# Patient Record
Sex: MALE | Race: WHITE | NOT HISPANIC OR LATINO | Employment: OTHER | URBAN - NONMETROPOLITAN AREA
[De-identification: names, ages, dates, MRNs, and addresses within clinical notes are randomized per-mention and may not be internally consistent; named-entity substitution may affect disease eponyms.]

---

## 2021-05-21 ENCOUNTER — APPOINTMENT (EMERGENCY)
Dept: RADIOLOGY | Facility: HOSPITAL | Age: 81
End: 2021-05-21
Payer: COMMERCIAL

## 2021-05-21 ENCOUNTER — HOSPITAL ENCOUNTER (EMERGENCY)
Facility: HOSPITAL | Age: 81
Discharge: HOME | End: 2021-05-21
Attending: EMERGENCY MEDICINE | Admitting: EMERGENCY MEDICINE
Payer: COMMERCIAL

## 2021-05-21 VITALS
HEIGHT: 66 IN | TEMPERATURE: 97 F | BODY MASS INDEX: 22.39 KG/M2 | RESPIRATION RATE: 14 BRPM | HEART RATE: 51 BPM | WEIGHT: 139.33 LBS | DIASTOLIC BLOOD PRESSURE: 84 MMHG | OXYGEN SATURATION: 97 % | SYSTOLIC BLOOD PRESSURE: 165 MMHG

## 2021-05-21 DIAGNOSIS — T14.8XXA HEMATOMA: Primary | ICD-10-CM

## 2021-05-21 LAB
ANION GAP SERPL CALCULATED.3IONS-SCNC: 2 MMOL/L (ref 4–13)
APTT PPP: 26 SECONDS (ref 23–37)
BASOPHILS # BLD AUTO: 0.04 THOUSANDS/ΜL (ref 0–0.1)
BASOPHILS NFR BLD AUTO: 0 % (ref 0–1)
BUN SERPL-MCNC: 16 MG/DL (ref 5–25)
CALCIUM SERPL-MCNC: 8.7 MG/DL (ref 8.3–10.1)
CHLORIDE SERPL-SCNC: 104 MMOL/L (ref 100–108)
CO2 SERPL-SCNC: 32 MMOL/L (ref 21–32)
CREAT SERPL-MCNC: 1.25 MG/DL (ref 0.6–1.3)
EOSINOPHIL # BLD AUTO: 0.05 THOUSAND/ΜL (ref 0–0.61)
EOSINOPHIL NFR BLD AUTO: 1 % (ref 0–6)
ERYTHROCYTE [DISTWIDTH] IN BLOOD BY AUTOMATED COUNT: 12.9 % (ref 11.6–15.1)
GFR SERPL CREATININE-BSD FRML MDRD: 54 ML/MIN/1.73SQ M
GLUCOSE SERPL-MCNC: 101 MG/DL (ref 65–140)
HCT VFR BLD AUTO: 40.4 % (ref 36.5–49.3)
HGB BLD-MCNC: 12.8 G/DL (ref 12–17)
IMM GRANULOCYTES # BLD AUTO: 0.05 THOUSAND/UL (ref 0–0.2)
IMM GRANULOCYTES NFR BLD AUTO: 1 % (ref 0–2)
INR PPP: 1.09 (ref 0.84–1.19)
LYMPHOCYTES # BLD AUTO: 1.79 THOUSANDS/ΜL (ref 0.6–4.47)
LYMPHOCYTES NFR BLD AUTO: 19 % (ref 14–44)
MCH RBC QN AUTO: 28.8 PG (ref 26.8–34.3)
MCHC RBC AUTO-ENTMCNC: 31.7 G/DL (ref 31.4–37.4)
MCV RBC AUTO: 91 FL (ref 82–98)
MONOCYTES # BLD AUTO: 0.75 THOUSAND/ΜL (ref 0.17–1.22)
MONOCYTES NFR BLD AUTO: 8 % (ref 4–12)
NEUTROPHILS # BLD AUTO: 6.77 THOUSANDS/ΜL (ref 1.85–7.62)
NEUTS SEG NFR BLD AUTO: 71 % (ref 43–75)
NRBC BLD AUTO-RTO: 0 /100 WBCS
PLATELET # BLD AUTO: 141 THOUSANDS/UL (ref 149–390)
PMV BLD AUTO: 10.1 FL (ref 8.9–12.7)
POTASSIUM SERPL-SCNC: 4.7 MMOL/L (ref 3.5–5.3)
PROTHROMBIN TIME: 13.9 SECONDS (ref 11.6–14.5)
RBC # BLD AUTO: 4.44 MILLION/UL (ref 3.88–5.62)
SODIUM SERPL-SCNC: 138 MMOL/L (ref 136–145)
WBC # BLD AUTO: 9.45 THOUSAND/UL (ref 4.31–10.16)

## 2021-05-21 PROCEDURE — 99284 EMERGENCY DEPT VISIT MOD MDM: CPT

## 2021-05-21 PROCEDURE — 85730 THROMBOPLASTIN TIME PARTIAL: CPT | Performed by: EMERGENCY MEDICINE

## 2021-05-21 PROCEDURE — 73590 X-RAY EXAM OF LOWER LEG: CPT

## 2021-05-21 PROCEDURE — 85025 COMPLETE CBC W/AUTO DIFF WBC: CPT | Performed by: EMERGENCY MEDICINE

## 2021-05-21 PROCEDURE — 85610 PROTHROMBIN TIME: CPT | Performed by: EMERGENCY MEDICINE

## 2021-05-21 PROCEDURE — 80048 BASIC METABOLIC PNL TOTAL CA: CPT | Performed by: EMERGENCY MEDICINE

## 2021-05-21 PROCEDURE — 99285 EMERGENCY DEPT VISIT HI MDM: CPT | Performed by: EMERGENCY MEDICINE

## 2021-05-21 PROCEDURE — 36415 COLL VENOUS BLD VENIPUNCTURE: CPT | Performed by: EMERGENCY MEDICINE

## 2021-05-21 RX ORDER — AMLODIPINE BESYLATE 5 MG/1
5 TABLET ORAL DAILY
COMMUNITY
Start: 2021-04-20

## 2021-05-21 RX ORDER — ROSUVASTATIN CALCIUM 20 MG/1
20 TABLET, COATED ORAL DAILY
COMMUNITY

## 2021-05-21 RX ORDER — SUCRALFATE 1 G/1
1 TABLET ORAL 2 TIMES DAILY
COMMUNITY

## 2021-05-21 RX ORDER — LANOLIN ALCOHOL/MO/W.PET/CERES
1 CREAM (GRAM) TOPICAL 2 TIMES DAILY
COMMUNITY

## 2021-05-21 RX ORDER — CLOPIDOGREL BISULFATE 75 MG/1
75 TABLET ORAL DAILY
COMMUNITY

## 2021-05-21 RX ORDER — CHLORAL HYDRATE 500 MG
1000 CAPSULE ORAL DAILY
COMMUNITY

## 2021-05-21 RX ORDER — PREGABALIN 50 MG/1
50 CAPSULE ORAL 2 TIMES DAILY
COMMUNITY

## 2021-05-21 RX ORDER — PANTOPRAZOLE SODIUM 40 MG/1
40 TABLET, DELAYED RELEASE ORAL DAILY
COMMUNITY

## 2021-08-13 ENCOUNTER — APPOINTMENT (EMERGENCY)
Dept: RADIOLOGY | Facility: HOSPITAL | Age: 81
End: 2021-08-13
Payer: COMMERCIAL

## 2021-08-13 ENCOUNTER — HOSPITAL ENCOUNTER (EMERGENCY)
Facility: HOSPITAL | Age: 81
Discharge: HOME | End: 2021-08-13
Attending: EMERGENCY MEDICINE
Payer: COMMERCIAL

## 2021-08-13 VITALS
OXYGEN SATURATION: 95 % | SYSTOLIC BLOOD PRESSURE: 158 MMHG | DIASTOLIC BLOOD PRESSURE: 79 MMHG | RESPIRATION RATE: 18 BRPM | HEART RATE: 73 BPM | TEMPERATURE: 97.2 F

## 2021-08-13 DIAGNOSIS — S60.111A: Primary | ICD-10-CM

## 2021-08-13 DIAGNOSIS — S60.111A CONTUSION OF RIGHT THUMB NAIL, INITIAL ENCOUNTER: ICD-10-CM

## 2021-08-13 PROCEDURE — 99284 EMERGENCY DEPT VISIT MOD MDM: CPT | Performed by: EMERGENCY MEDICINE

## 2021-08-13 PROCEDURE — 99283 EMERGENCY DEPT VISIT LOW MDM: CPT

## 2021-08-13 PROCEDURE — 90715 TDAP VACCINE 7 YRS/> IM: CPT | Performed by: EMERGENCY MEDICINE

## 2021-08-13 PROCEDURE — 73140 X-RAY EXAM OF FINGER(S): CPT

## 2021-08-13 PROCEDURE — 90471 IMMUNIZATION ADMIN: CPT

## 2021-08-13 RX ORDER — CEPHALEXIN 250 MG/1
500 CAPSULE ORAL ONCE
Status: COMPLETED | OUTPATIENT
Start: 2021-08-13 | End: 2021-08-13

## 2021-08-13 RX ORDER — CEPHALEXIN 500 MG/1
500 CAPSULE ORAL EVERY 6 HOURS SCHEDULED
Qty: 20 CAPSULE | Refills: 0 | Status: SHIPPED | OUTPATIENT
Start: 2021-08-13 | End: 2021-08-18

## 2021-08-13 RX ADMIN — TETANUS TOXOID, REDUCED DIPHTHERIA TOXOID AND ACELLULAR PERTUSSIS VACCINE, ADSORBED 0.5 ML: 5; 2.5; 8; 8; 2.5 SUSPENSION INTRAMUSCULAR at 13:55

## 2021-08-13 RX ADMIN — CEPHALEXIN 500 MG: 250 CAPSULE ORAL at 13:55

## 2021-08-13 NOTE — ED NOTES
Pt in no acute distress  Ambulates with a steady gait   Verbalizes understanding of discharge instructions       Haydee Dominguez RN  08/13/21 6227

## 2021-08-13 NOTE — ED PROVIDER NOTES
History  Chief Complaint   Patient presents with    Finger Injury     pt states a week ago R thumb got  sliced by a rotor when working on car  pt states increase in pain  pt has been putting antibiotic ointment on wound  Injury to right thumb last week on a router while fixing a car  Complains of continued pain  Tetanus status on shore  No new injury  No fevers or chills  No nausea vomiting or diarrhea  History provided by:  Patient   used: No    Hand Pain  Location:  Right thumb pain  Quality:  Achy  Severity:  Mild  Onset quality:  Sudden  Duration:  1 week  Timing:  Constant  Progression:  Unchanged  Chronicity:  New  Context:  Right got injury to on a rotor fixing a car  Relieved by:  Nothing  Worsened by:  Palpation  Ineffective treatments:  None tried  Associated symptoms: no abdominal pain, no chest pain, no congestion, no cough, no diarrhea, no ear pain, no fever, no headaches, no loss of consciousness, no nausea, no rash, no shortness of breath, no sore throat, no vomiting and no wheezing        Prior to Admission Medications   Prescriptions Last Dose Informant Patient Reported? Taking?    ASPIRIN 81 PO   Yes No   Sig: Take 81 mg by mouth daily   Omega-3 Fatty Acids (fish oil) 1,000 mg   Yes No   Sig: Take 1,000 mg by mouth daily   amLODIPine (NORVASC) 5 mg tablet   Yes No   Sig: Take 5 mg by mouth daily   clopidogrel (PLAVIX) 75 mg tablet   Yes No   Sig: Take 75 mg by mouth daily   co-enzyme Q-10 50 MG capsule   Yes No   Sig: Take 100 mg by mouth daily   glucosamine-chondroitin 500-400 MG tablet   Yes No   Sig: Take 1 tablet by mouth 2 (two) times a day   metFORMIN (GLUCOPHAGE) 500 mg tablet   Yes No   Sig: Take 750 mg by mouth daily with breakfast   metoprolol tartrate (LOPRESSOR) 25 mg tablet   Yes No   Sig: Take 12 5 mg by mouth daily   pantoprazole (PROTONIX) 40 mg tablet   Yes No   Sig: Take 40 mg by mouth daily   pregabalin (LYRICA) 50 mg capsule   Yes No   Sig: Take 50 mg by mouth 2 (two) times a day   rosuvastatin (CRESTOR) 20 MG tablet   Yes No   Sig: Take 20 mg by mouth daily   sucralfate (CARAFATE) 1 g tablet   Yes No   Sig: Take 1 g by mouth 2 (two) times a day      Facility-Administered Medications: None       Past Medical History:   Diagnosis Date    Diabetes mellitus ([facility] [address] )     Hx of heart artery stent     x 18    Hypertension     Renal disorder     1 functioning kidney       Past Surgical History:   Procedure Laterality Date    BACK SURGERY      CARDIAC SURGERY      CAROTID STENT      CHOLECYSTECTOMY      EAR SURGERY      EYE SURGERY Bilateral     cataract       History reviewed  No pertinent family history  I have reviewed and agree with the history as documented  E-Cigarette/Vaping    E-Cigarette Use Never User      E-Cigarette/Vaping Substances     Social History     Tobacco Use    Smoking status: Former Smoker    Smokeless tobacco: Never Used   Vaping Use    Vaping Use: Never used   Substance Use Topics    Alcohol use: Not Currently    Drug use: Not Currently       Review of Systems   Constitutional: Negative for chills and fever  HENT: Negative for congestion, ear pain, hearing loss, sore throat, trouble swallowing and voice change  Eyes: Negative for pain and discharge  Respiratory: Negative for cough, shortness of breath and wheezing  Cardiovascular: Negative for chest pain and palpitations  Gastrointestinal: Negative for abdominal pain, blood in stool, constipation, diarrhea, nausea and vomiting  Genitourinary: Negative for dysuria, flank pain, frequency and hematuria  Musculoskeletal: Negative for joint swelling, neck pain and neck stiffness  Skin: Negative for rash and wound  Neurological: Negative for dizziness, seizures, loss of consciousness, syncope, facial asymmetry and headaches  Psychiatric/Behavioral: Negative for hallucinations, self-injury and suicidal ideas     All other systems reviewed and are negative  Physical Exam  Physical Exam  Constitutional:       General: He is not in acute distress  Appearance: Normal appearance  He is not ill-appearing  HENT:      Head: Normocephalic and atraumatic  Right Ear: External ear normal       Left Ear: External ear normal       Nose: Nose normal       Mouth/Throat:      Mouth: Mucous membranes are moist    Eyes:      Extraocular Movements: Extraocular movements intact  Pupils: Pupils are equal, round, and reactive to light  Cardiovascular:      Rate and Rhythm: Normal rate and regular rhythm  Pulmonary:      Effort: Pulmonary effort is normal  No respiratory distress  Breath sounds: Normal breath sounds  Abdominal:      General: Abdomen is flat  Bowel sounds are normal  There is no distension  Palpations: Abdomen is soft  Tenderness: There is no abdominal tenderness  Musculoskeletal:         General: No swelling or tenderness  Cervical back: Normal range of motion and neck supple  Comments: Right thumb full range of motion  There is a laceration x2 at the nail  There is old dry blood  There is no surrounding erythema or warmth or fluctuance  Skin:     General: Skin is warm and dry  Capillary Refill: Capillary refill takes less than 2 seconds  Neurological:      General: No focal deficit present  Mental Status: He is alert and oriented to person, place, and time     Psychiatric:         Mood and Affect: Mood normal          Behavior: Behavior normal          Vital Signs  ED Triage Vitals   Temperature Pulse Respirations Blood Pressure SpO2   08/13/21 1317 08/13/21 1317 08/13/21 1317 08/13/21 1318 08/13/21 1317   (!) 97 2 °F (36 2 °C) 73 18 158/79 95 %      Temp Source Heart Rate Source Patient Position - Orthostatic VS BP Location FiO2 (%)   08/13/21 1317 08/13/21 1317 08/13/21 1317 08/13/21 1317 --   Temporal Monitor Sitting Left arm       Pain Score       08/13/21 1317       4           Vitals: 08/13/21 1317 08/13/21 1318   BP:  158/79   Pulse: 73    Patient Position - Orthostatic VS: Sitting          Visual Acuity      ED Medications  Medications   tetanus-diphtheria-acellular pertussis (BOOSTRIX) IM injection 0 5 mL (has no administration in time range)   cephalexin (KEFLEX) capsule 500 mg (has no administration in time range)       Diagnostic Studies  Results Reviewed     None                 XR thumb first digit-min 2 views RIGHT   ED Interpretation by Julia Sigala MD (08/13 1025)   No fx                 Procedures  Procedures         ED Course                                           MDM    Disposition  Final diagnoses:   Hematoma, subungual, thumb, right, initial encounter   Contusion of right thumb nail, initial encounter     Time reflects when diagnosis was documented in both MDM as applicable and the Disposition within this note     Time User Action Codes Description Comment    8/13/2021  1:34 PM Visperas, Florestine Shone Add [S60 111A] Hematoma, subungual, thumb, right, initial encounter     8/13/2021  1:36 PM Visperas, Florestine Shone Add [S60 111A] Contusion of right thumb nail, initial encounter       ED Disposition     ED Disposition Condition Date/Time Comment    Discharge Stable Fri Aug 13, 2021  1:33 PM Arcadio De La Rosa discharge to home/self care  Follow-up Information     Follow up With Specialties Details Why Contact Info    Griffin Grideral, DO General Practice Call in 2 days  [address]  [phone]            Patient's Medications   Discharge Prescriptions    CEPHALEXIN (KEFLEX) 500 MG CAPSULE    Take 1 capsule (500 mg total) by mouth every 6 (six) hours for 5 days       Start Date: 8/13/2021 End Date: 8/18/2021       Order Dose: 500 mg       Quantity: 20 capsule    Refills: 0     No discharge procedures on file      PDMP Review     None          ED Provider  Electronically Signed by           Julia Sigala MD  08/13/21 Naomie Vann MD  08/13/21 3777

## 2022-02-28 ENCOUNTER — HOSPITAL ENCOUNTER (EMERGENCY)
Facility: HOSPITAL | Age: 82
Discharge: HOME | End: 2022-02-28
Attending: STUDENT IN AN ORGANIZED HEALTH CARE EDUCATION/TRAINING PROGRAM | Admitting: STUDENT IN AN ORGANIZED HEALTH CARE EDUCATION/TRAINING PROGRAM
Payer: COMMERCIAL

## 2022-02-28 VITALS
HEIGHT: 66 IN | TEMPERATURE: 97.3 F | WEIGHT: 142.42 LBS | RESPIRATION RATE: 20 BRPM | BODY MASS INDEX: 22.89 KG/M2 | DIASTOLIC BLOOD PRESSURE: 74 MMHG | OXYGEN SATURATION: 96 % | SYSTOLIC BLOOD PRESSURE: 135 MMHG | HEART RATE: 77 BPM

## 2022-02-28 DIAGNOSIS — M79.605 LOW BACK PAIN RADIATING TO LEFT LOWER EXTREMITY: Primary | ICD-10-CM

## 2022-02-28 DIAGNOSIS — M54.50 LOW BACK PAIN RADIATING TO LEFT LOWER EXTREMITY: Primary | ICD-10-CM

## 2022-02-28 PROCEDURE — 96374 THER/PROPH/DIAG INJ IV PUSH: CPT

## 2022-02-28 PROCEDURE — 96372 THER/PROPH/DIAG INJ SC/IM: CPT

## 2022-02-28 PROCEDURE — 99284 EMERGENCY DEPT VISIT MOD MDM: CPT

## 2022-02-28 PROCEDURE — 99284 EMERGENCY DEPT VISIT MOD MDM: CPT | Performed by: STUDENT IN AN ORGANIZED HEALTH CARE EDUCATION/TRAINING PROGRAM

## 2022-02-28 RX ORDER — KETOROLAC TROMETHAMINE 30 MG/ML
15 INJECTION, SOLUTION INTRAMUSCULAR; INTRAVENOUS ONCE
Status: COMPLETED | OUTPATIENT
Start: 2022-02-28 | End: 2022-02-28

## 2022-02-28 RX ORDER — DEXAMETHASONE SODIUM PHOSPHATE 4 MG/ML
10 INJECTION, SOLUTION INTRA-ARTICULAR; INTRALESIONAL; INTRAMUSCULAR; INTRAVENOUS; SOFT TISSUE ONCE
Status: COMPLETED | OUTPATIENT
Start: 2022-02-28 | End: 2022-02-28

## 2022-02-28 RX ORDER — LIDOCAINE 50 MG/G
1 PATCH TOPICAL ONCE
Status: DISCONTINUED | OUTPATIENT
Start: 2022-02-28 | End: 2022-02-28 | Stop reason: HOSPADM

## 2022-02-28 RX ORDER — ACETAMINOPHEN 325 MG/1
975 TABLET ORAL ONCE
Status: COMPLETED | OUTPATIENT
Start: 2022-02-28 | End: 2022-02-28

## 2022-02-28 RX ORDER — BUPIVACAINE HYDROCHLORIDE 5 MG/ML
10 INJECTION, SOLUTION EPIDURAL; INTRACAUDAL ONCE
Status: COMPLETED | OUTPATIENT
Start: 2022-02-28 | End: 2022-02-28

## 2022-02-28 RX ADMIN — LIDOCAINE 1 PATCH: 50 PATCH TOPICAL at 19:18

## 2022-02-28 RX ADMIN — KETOROLAC TROMETHAMINE 15 MG: 30 INJECTION, SOLUTION INTRAMUSCULAR at 19:17

## 2022-02-28 RX ADMIN — BUPIVACAINE HYDROCHLORIDE 10 ML: 5 INJECTION, SOLUTION EPIDURAL; INTRACAUDAL; PERINEURAL at 19:17

## 2022-02-28 RX ADMIN — ACETAMINOPHEN 975 MG: 325 TABLET ORAL at 19:18

## 2022-02-28 RX ADMIN — DEXAMETHASONE SODIUM PHOSPHATE 10 MG: 4 INJECTION, SOLUTION INTRAMUSCULAR; INTRAVENOUS at 19:17

## 2022-02-28 NOTE — ED PROVIDER NOTES
History  Chief Complaint   Patient presents with    Flank Pain     Pt reports left flank pain radiating into left thigh, reports hx of right kidney removal but denies any changes in urination or injury        History provided by:  Patient  Back Pain  Location:  Sacro-iliac joint  Quality:  Stabbing  Radiates to:  L posterior upper leg and L thigh  Pain severity:  Severe  Pain is:  Unable to specify  Onset quality:  Gradual  Timing:  Constant  Progression:  Worsening  Chronicity:  Recurrent  Context: not lifting heavy objects, not physical stress, not recent injury and not twisting    Relieved by:  Nothing  Worsened by: Movement, twisting and ambulation  Ineffective treatments: icy hot, TENS, muscle relaxant   Associated symptoms: leg pain    Associated symptoms: no abdominal pain, no bladder incontinence, no bowel incontinence, no chest pain, no dysuria, no fever, no headaches, no numbness, no paresthesias, no pelvic pain, no perianal numbness, no tingling and no weakness       79year old M  Hx of sciatica  Presents to the ED with worsening left lower back pain with radiation into the left lateral/posterior lower extremity  The patient has been using a TENS unit, applying [facility] and taking a prescribed muscle relaxer  Movement exacerbates the pain while rest improves his pain  Describes his pain as sharp in nature and 6/10 in severity  Denies red flag symptoms of cauda equina syndrome  Prior to Admission Medications   Prescriptions Last Dose Informant Patient Reported? Taking?    ASPIRIN 81 PO   Yes No   Sig: Take 81 mg by mouth daily   Omega-3 Fatty Acids (fish oil) 1,000 mg   Yes No   Sig: Take 1,000 mg by mouth daily   amLODIPine (NORVASC) 5 mg tablet   Yes No   Sig: Take 5 mg by mouth daily   clopidogrel (PLAVIX) 75 mg tablet   Yes No   Sig: Take 75 mg by mouth daily   co-enzyme Q-10 50 MG capsule   Yes No   Sig: Take 100 mg by mouth daily   glucosamine-chondroitin 500-400 MG tablet   Yes No   Sig: Take 1 tablet by mouth 2 (two) times a day   metFORMIN (GLUCOPHAGE) 500 mg tablet   Yes No   Sig: Take 750 mg by mouth daily with breakfast   metoprolol tartrate (LOPRESSOR) 25 mg tablet   Yes No   Sig: Take 12 5 mg by mouth daily   pantoprazole (PROTONIX) 40 mg tablet   Yes No   Sig: Take 40 mg by mouth daily   pregabalin (LYRICA) 50 mg capsule   Yes No   Sig: Take 50 mg by mouth 2 (two) times a day   rosuvastatin (CRESTOR) 20 MG tablet   Yes No   Sig: Take 20 mg by mouth daily   sucralfate (CARAFATE) 1 g tablet   Yes No   Sig: Take 1 g by mouth 2 (two) times a day      Facility-Administered Medications: None       Past Medical History:   Diagnosis Date    Diabetes mellitus ([facility] [address] )     Hx of heart artery stent     x 18    Hypertension     Renal disorder     1 functioning kidney       Past Surgical History:   Procedure Laterality Date    BACK SURGERY      CARDIAC SURGERY      CAROTID STENT      CHOLECYSTECTOMY      EAR SURGERY      EYE SURGERY Bilateral     cataract       History reviewed  No pertinent family history  I have reviewed and agree with the history as documented  E-Cigarette/Vaping    E-Cigarette Use Never User      E-Cigarette/Vaping Substances     Social History     Tobacco Use    Smoking status: Former Smoker    Smokeless tobacco: Never Used   Vaping Use    Vaping Use: Never used   Substance Use Topics    Alcohol use: Not Currently    Drug use: Not Currently     Review of Systems   Constitutional: Negative for chills and fever  HENT: Negative for congestion, rhinorrhea, sinus pressure and sinus pain  Respiratory: Negative for cough, chest tightness and shortness of breath  Cardiovascular: Negative for chest pain and palpitations  Gastrointestinal: Negative for abdominal pain, bowel incontinence, nausea and vomiting  Genitourinary: Negative for bladder incontinence, decreased urine volume, difficulty urinating, dysuria, flank pain, frequency, pelvic pain and urgency  Musculoskeletal: Positive for arthralgias, back pain and gait problem  Negative for myalgias, neck pain and neck stiffness  Skin: Negative for color change, pallor, rash and wound  Neurological: Negative for dizziness, tingling, weakness, light-headedness, numbness, headaches and paresthesias  Hematological: Does not bruise/bleed easily  Psychiatric/Behavioral: Positive for sleep disturbance  Negative for confusion  All other systems reviewed and are negative  Physical Exam  Physical Exam  Vitals and nursing note reviewed  Constitutional:       General: He is not in acute distress  Appearance: He is not ill-appearing  HENT:      Head: Normocephalic and atraumatic  Right Ear: External ear normal       Left Ear: External ear normal    Eyes:      General:         Right eye: No discharge  Left eye: No discharge  Extraocular Movements: Extraocular movements intact  Conjunctiva/sclera: Conjunctivae normal    Cardiovascular:      Rate and Rhythm: Normal rate and regular rhythm  Pulses: Normal pulses  Heart sounds: Normal heart sounds  Pulmonary:      Effort: Pulmonary effort is normal  No respiratory distress  Breath sounds: Normal breath sounds  No stridor  No wheezing, rhonchi or rales  Chest:      Chest wall: No tenderness  Abdominal:      General: Abdomen is flat  Bowel sounds are normal       Palpations: Abdomen is soft  Tenderness: There is no abdominal tenderness  There is no right CVA tenderness, left CVA tenderness, guarding or rebound  Musculoskeletal:         General: Tenderness present  No swelling, deformity or signs of injury  Cervical back: Neck supple  No tenderness  Back:       Comments: Tenderness to palpation/hypertonicity of the musculature of the left lower back  No midline spine tenderness  No overlying skin changes noted  Tenderness to palpation along the left lateral thigh     Skin:     General: Skin is warm and dry       Capillary Refill: Capillary refill takes less than 2 seconds  Coloration: Skin is not jaundiced or pale  Findings: No bruising, erythema, lesion or rash  Neurological:      General: No focal deficit present  Mental Status: He is alert and oriented to person, place, and time  Mental status is at baseline  Cranial Nerves: No cranial nerve deficit  Sensory: No sensory deficit  Motor: No weakness  Comments: Bilateral patellar, Achilles DTRs are intact  5/5 muscle strength of the bilateral lower extremities  Psychiatric:         Mood and Affect: Mood normal          Behavior: Behavior normal          Thought Content: Thought content normal          Judgment: Judgment normal        Vital Signs  ED Triage Vitals   Temperature Pulse Respirations Blood Pressure SpO2   02/28/22 1849 02/28/22 1849 02/28/22 1849 02/28/22 1849 02/28/22 1849   (!) 97 3 °F (36 3 °C) 77 20 135/74 96 %      Temp Source Heart Rate Source Patient Position - Orthostatic VS BP Location FiO2 (%)   02/28/22 1849 02/28/22 1849 02/28/22 1849 02/28/22 1849 --   Temporal Monitor Lying Right arm       Pain Score       02/28/22 1917       3           Vitals:    02/28/22 1849   BP: 135/74   Pulse: 77   Patient Position - Orthostatic VS: Lying     ED Medications  Medications   lidocaine (LIDODERM) 5 % patch 1 patch (1 patch Topical Medication Applied 2/28/22 1918)   ketorolac (TORADOL) injection 15 mg (15 mg Intramuscular Given 2/28/22 1917)   dexamethasone (DECADRON) injection 10 mg (10 mg Intramuscular Given 2/28/22 1917)   acetaminophen (TYLENOL) tablet 975 mg (975 mg Oral Given 2/28/22 1918)   bupivacaine (PF) (MARCAINE) 0 5 % injection 10 mL (10 mL Injection Given 2/28/22 1917)     Diagnostic Studies  Results Reviewed     None             No orders to display          Procedures  Trigger Injection 1 or 2 muscles    Date/Time: 2/28/2022 7:31 PM  Performed by: Swathi Hernandez DO  Authorized by:  Floyd Hill DO Kathe     Patient location:  ED  Other Assisting Provider: No    Consent:     Consent obtained:  Verbal    Consent given by:  Patient    Risks discussed: Allergic reaction, infection, nerve damage, unsuccessful block, swelling, pain, intravenous injection and bleeding    Alternatives discussed:  No treatment  Universal protocol:     Patient identity confirmed:  Verbally with patient  Location:     Body area:  Lower back    Injection Trigger Points:  2  Pre-procedure details:     Skin preparation:  Antiseptic wash  Skin anesthesia:     Skin anesthesia method:  None  Procedure details:     Needle gauge:  25 G    Anesthetic injected:  Bupivacaine 0 5% w/o epi    Steroid injected:  None    Additive injected:  None    Injection procedure:  Anatomic landmarks identified, incremental injection, negative aspiration for blood, anatomic landmarks palpated and introduced needle  Post-procedure details:     Dressing:  None    Outcome:  Pain relieved    Patient tolerance of procedure: Tolerated well, no immediate complications      ED Course       MDM     19-year-old male  Presents to the emergency department with worsening left lower back pain with radiation into the left upper extremity  He states that he has a history of sciatica  Has been using icy Hot, TENS, prescribed muscle relaxers without affect  Denies recent trauma, red flag symptoms of cauda equina syndrome  On exam, the patient has tenderness to palpation along left lumbar paraspinal musculature, left lateral thigh  No midline spine tenderness  Bilateral patellar/Achilles DTRs are intact  5/5 muscle strength of the lower extremities  Given the patient has hypertonicity/tenderness to palpation along the musculature of the left lower back, a trigger point injection was performed at the bedside  See procedure note above  Pain improved    The patient was also administered a dose of intramuscular ketorolac, intramuscular Decadron, oral Tylenol which improved his pain  Lidocaine patch applied  Signs and symptoms likely related to sciatica and/or IT band syndrome  Will likely require physical therapy  Follow-up with the primary care provider was recommended  Other supportive care measures and return precautions were discussed  All questions were addressed  The patient was stable for discharge  Disposition  Final diagnoses:   Low back pain radiating to left lower extremity     Time reflects when diagnosis was documented in both MDM as applicable and the Disposition within this note     Time User Action Codes Description Comment    2/28/2022  7:59 PM Erik Ramos Add [M54 50,  M09 605] Low back pain radiating to left lower extremity       ED Disposition     ED Disposition Condition Date/Time Comment    Discharge Stable Mon Feb 28, 2022  7:58 PM Shan Juarez discharge to home/self care              Follow-up Information     Follow up With Specialties Details Why [facility],  [facility]   [address]  [phone]            Discharge Medication List as of 2/28/2022  8:02 PM      CONTINUE these medications which have NOT CHANGED    Details   amLODIPine (NORVASC) 5 mg tablet Take 5 mg by mouth daily, Starting Tue 4/20/2021, Historical Med      ASPIRIN 81 PO Take 81 mg by mouth daily, Historical Med      clopidogrel (PLAVIX) 75 mg tablet Take 75 mg by mouth daily, Historical Med      co-enzyme Q-10 50 MG capsule Take 100 mg by mouth daily, Historical Med      glucosamine-chondroitin 500-400 MG tablet Take 1 tablet by mouth 2 (two) times a day, Historical Med      metFORMIN (GLUCOPHAGE) 500 mg tablet Take 750 mg by mouth daily with breakfast, Historical Med      metoprolol tartrate (LOPRESSOR) 25 mg tablet Take 12 5 mg by mouth daily, Historical Med      Omega-3 Fatty Acids (fish oil) 1,000 mg Take 1,000 mg by mouth daily, Historical Med      pantoprazole (PROTONIX) 40 mg tablet Take 40 mg by mouth daily, Historical Med      pregabalin (LYRICA) 50 mg capsule Take 50 mg by mouth 2 (two) times a day, Historical Med      rosuvastatin (CRESTOR) 20 MG tablet Take 20 mg by mouth daily, Historical Med      sucralfate (CARAFATE) 1 g tablet Take 1 g by mouth 2 (two) times a day, Historical Med             No discharge procedures on file      PDMP Review     None          ED Provider  Electronically Signed by           Kelly Frias DO  02/28/22 2100

## 2022-03-01 NOTE — DISCHARGE INSTRUCTIONS
You were evaluated in the ED for left lower back pain with radiation into the left leg  This pain is likely due to sciatica and/or IT band syndrome  You may require physical therapy  Follow-up with the primary care physician  Keep the lidocaine patch applied for 12 hours then removed  You were administered a dose of Decadron which should help relieve ear pain for the next few days  For pain, you can take Tylenol 1000 mg every 6 hours  Do not hesitate to return to the emergency department for any concerning signs or symptoms

## 2022-03-30 ENCOUNTER — HOSPITAL ENCOUNTER (OUTPATIENT)
Dept: RADIOLOGY | Facility: HOSPITAL | Age: 82
Discharge: HOME | End: 2022-03-30
Payer: COMMERCIAL

## 2022-03-30 ENCOUNTER — HOSPITAL ENCOUNTER (OUTPATIENT)
Dept: MRI IMAGING | Facility: HOSPITAL | Age: 82
Discharge: HOME | End: 2022-03-30
Payer: COMMERCIAL

## 2022-03-30 DIAGNOSIS — M79.5 FOREIGN BODY (FB) IN SOFT TISSUE: ICD-10-CM

## 2022-03-30 DIAGNOSIS — M54.16 LUMBAR RADICULOPATHY: ICD-10-CM

## 2022-03-30 DIAGNOSIS — M54.50 LOW BACK PAIN, UNSPECIFIED BACK PAIN LATERALITY, UNSPECIFIED CHRONICITY, UNSPECIFIED WHETHER SCIATICA PRESENT: ICD-10-CM

## 2022-03-30 PROCEDURE — 72148 MRI LUMBAR SPINE W/O DYE: CPT

## 2022-03-30 PROCEDURE — 72110 X-RAY EXAM L-2 SPINE 4/>VWS: CPT

## 2022-12-29 ENCOUNTER — HOSPITAL ENCOUNTER (OUTPATIENT)
Dept: NON INVASIVE DIAGNOSTICS | Facility: HOSPITAL | Age: 82
Discharge: HOME | End: 2022-12-29

## 2022-12-29 ENCOUNTER — HOSPITAL ENCOUNTER (OUTPATIENT)
Dept: NUCLEAR MEDICINE | Facility: HOSPITAL | Age: 82
End: 2022-12-29

## 2022-12-29 DIAGNOSIS — R06.00 DYSPNEA, UNSPECIFIED TYPE: ICD-10-CM

## 2022-12-29 LAB
ARRHY DURING EX: NORMAL
CHEST PAIN STATEMENT: NORMAL
MAX DIASTOLIC BP: 82 MMHG
MAX HEART RATE: 99 BPM
MAX HR: 99 BPM
MAX PREDICTED HEART RATE: 137 BPM
MAX. SYSTOLIC BP: 144 MMHG
NUC STRESS EJECTION FRACTION: 47 %
PROTOCOL NAME: NORMAL
RATE PRESSURE PRODUCT: NORMAL
REASON FOR TERMINATION: NORMAL
SL CV REST NUCLEAR ISOTOPE DOSE: 10.6 MCI
SL CV STRESS NUCLEAR ISOTOPE DOSE: 32.4 MCI
SL CV STRESS RECOVERY BP: NORMAL MMHG
SL CV STRESS RECOVERY HR: 87 BPM
SL CV STRESS RECOVERY O2 SAT: 98 %
STRESS ANGINA INDEX: 0
STRESS BASELINE BP: NORMAL MMHG
STRESS BASELINE HR: 64 BPM
STRESS DUKE TREADMILL SCORE: 3
STRESS O2 SAT REST: 97 %
STRESS PEAK HR: 99 BPM
STRESS POST EXERCISE DUR MIN: 3 MIN
STRESS POST EXERCISE DUR SEC: 0 SEC
STRESS POST O2 SAT PEAK: 98 %
STRESS POST PEAK BP: 132 MMHG
STRESS ST DEPRESSION: 0 MM
TARGET HR FORMULA: NORMAL
TEST INDICATION: NORMAL
TIME IN EXERCISE PHASE: NORMAL

## 2022-12-29 RX ORDER — ALBUTEROL SULFATE 90 UG/1
2 AEROSOL, METERED RESPIRATORY (INHALATION) EVERY 6 HOURS PRN
COMMUNITY

## 2022-12-29 RX ORDER — DOCUSATE SODIUM 250 MG
250 CAPSULE ORAL DAILY
COMMUNITY

## 2022-12-29 RX ORDER — FLUTICASONE PROPIONATE 50 MCG
1 SPRAY, SUSPENSION (ML) NASAL DAILY
COMMUNITY

## 2022-12-29 RX ORDER — DOXYCYCLINE HYCLATE 100 MG/1
100 CAPSULE ORAL EVERY 12 HOURS SCHEDULED
COMMUNITY

## 2022-12-29 RX ORDER — TRAZODONE HYDROCHLORIDE 50 MG/1
50 TABLET ORAL
COMMUNITY

## 2022-12-29 RX ORDER — PANTOPRAZOLE SODIUM 40 MG/1
40 TABLET, DELAYED RELEASE ORAL DAILY
COMMUNITY

## 2022-12-29 RX ORDER — EZETIMIBE 10 MG/1
10 TABLET ORAL DAILY
COMMUNITY

## 2022-12-29 RX ORDER — MULTIVITAMIN
1 TABLET ORAL DAILY
COMMUNITY

## 2022-12-29 RX ORDER — DULOXETIN HYDROCHLORIDE 30 MG/1
30 CAPSULE, DELAYED RELEASE ORAL DAILY
COMMUNITY

## 2022-12-29 RX ADMIN — REGADENOSON 0.4 MG: 0.08 INJECTION, SOLUTION INTRAVENOUS at 11:06

## 2023-10-16 PROBLEM — N48.1 BALANITIS: Status: ACTIVE | Noted: 2023-10-16

## 2023-10-16 NOTE — PROGRESS NOTES
UROLOGY PROGRESS NOTE         NAME: Kalpana Brambila  AGE: 79 y.o. SEX: male  : 1940   MRN: [id number]    DATE: 10/19/2023  TIME: 11:45 AM    Assessment and Plan   Procedures     Impression:   1. Balanitis    2. Nodular prostate    3. Benign prostatic hyperplasia with urinary frequency         Plan: For the penile balanitis infection he is currently asymptomatic and going to recommend observation. I told the patient if the symptoms come back I need to do a physical exam to make a recommendation of what cream I would use to clear it up. He understands and agrees. Regarding his prostate nodule suspicious for prostate cancer I recommended a multiparametric MRI versus a prostate biopsy versus observation. The risk of all 3 options were discussed with the patient and he elects for MP MRI and then we will bring him back to discuss the results. For completeness we will get a baseline PSA. Chief Complaint     Chief Complaint   Patient presents with    rash on penis    Nocturia     History of Present Illness     HPI: Kalpana Brambila is a 79y.o. year old male who presents with balanitis. Patient states he gets some redness on the foreskin and some little bumps on the glans. He said it also itches. He states he has been circumcised. He uses a steroid cream and it helps a little bit but always seems to come back. Patient states he has not had a recent TAVON or PSA. He states he has been on Flomax but I do not see it on his MAR sheet. He does have some urgency frequency but he not bothered by it he does have Parkinson's disease his flow is okay he said. He denies any dysuria hematuria.   He does have a rectal dysfunction nonbothersome              The following portions of the patient's history were reviewed and updated as appropriate: allergies, current medications, past family history, past medical history, past social history, past surgical history and problem list.  Past Medical History: Diagnosis Date    Diabetes mellitus ([address])     Hx of heart artery stent     x 18    Hypertension     Renal disorder     1 functioning kidney     Past Surgical History:   Procedure Laterality Date    BACK SURGERY      CARDIAC SURGERY      CAROTID STENT      CHOLECYSTECTOMY      EAR SURGERY      EYE SURGERY Bilateral     cataract     shoulder  Review of Systems     Const: Denies chills, fever and weight loss. CV: Denies chest pain. Resp: Denies SOB. GI: Denies abdominal pain, nausea and vomiting. : Denies symptoms other than stated above. Musculo: Denies back pain. Objective   /56   Pulse 80   Ht 5' 6" (1.676 m)   Wt 80.3 kg (177 lb)   SpO2 99%   BMI 28.57 kg/m²     Physical Exam  Const: Appears healthy and well developed. No signs of acute distress present. Resp: Respirations are regular and unlabored. CV: Rate is regular. Rhythm is regular. Abdomen: Abdomen is soft, nontender, and nondistended. Kidneys are not palpable. : He had a normal external genitalia exam I did not see any evidence of balanitis or phimosis. His prostate was abnormal at right base nodule of a concern size was slightly large. Psych: Patient's attitude is cooperative.  Mood is normal. Affect is normal.    Current Medications     Current Outpatient Medications:     albuterol (PROVENTIL HFA,VENTOLIN HFA) 90 mcg/act inhaler, Inhale 2 puffs every 6 (six) hours as needed for wheezing, Disp: , Rfl:     amLODIPine (NORVASC) 5 mg tablet, Take 5 mg by mouth daily, Disp: , Rfl:     ASPIRIN 81 PO, Take 81 mg by mouth daily, Disp: , Rfl:     betamethasone, augmented, (Diprolene) 0.05 % ointment, Apply topically, Disp: , Rfl:     carbidopa-levodopa (SINEMET)  mg per tablet, Take 1 tablet by mouth 3 (three) times a day, Disp: , Rfl:     co-enzyme Q-10 50 MG capsule, Take 100 mg by mouth daily, Disp: , Rfl:     docusate sodium (COLACE) 250 MG capsule, Take 250 mg by mouth daily, Disp: , Rfl:     doxycycline hyclate (VIBRAMYCIN) 100 mg capsule, Take 100 mg by mouth every 12 (twelve) hours, Disp: , Rfl:     DULoxetine (CYMBALTA) 30 mg delayed release capsule, Take 30 mg by mouth daily, Disp: , Rfl:     ezetimibe (ZETIA) 10 mg tablet, Take 10 mg by mouth daily, Disp: , Rfl:     fluticasone (FLONASE) 50 mcg/act nasal spray, 1 spray into each nostril daily, Disp: , Rfl:     glucosamine-chondroitin 500-400 MG tablet, Take 1 tablet by mouth 2 (two) times a day, Disp: , Rfl:     insulin detemir (LEVEMIR) 100 units/mL subcutaneous injection, Inject under the skin, Disp: , Rfl:     insulin regular (HumuLIN R,NovoLIN R) 100 units/mL injection, Inject under the skin, Disp: , Rfl:     metFORMIN (GLUCOPHAGE) 500 mg tablet, Take 750 mg by mouth daily with breakfast, Disp: , Rfl:     Multiple Vitamin (multivitamin) tablet, Take 1 tablet by mouth daily, Disp: , Rfl:     Omega-3 Fatty Acids (fish oil) 1,000 mg, Take 1,000 mg by mouth daily, Disp: , Rfl:     pantoprazole (PROTONIX) 40 mg tablet, Take 40 mg by mouth daily, Disp: , Rfl:     pregabalin (LYRICA) 50 mg capsule, Take 50 mg by mouth 2 (two) times a day, Disp: , Rfl:     rosuvastatin (CRESTOR) 20 MG tablet, Take 20 mg by mouth daily, Disp: , Rfl:     sucralfate (CARAFATE) 1 g tablet, Take 1 g by mouth 2 (two) times a day, Disp: , Rfl:     traZODone (DESYREL) 50 mg tablet, Take 50 mg by mouth daily at bedtime, Disp: , Rfl:         Cade Harris MD

## 2023-10-18 RX ORDER — BETAMETHASONE DIPROPIONATE 0.5 MG/G
OINTMENT TOPICAL
COMMUNITY

## 2023-10-19 ENCOUNTER — OFFICE VISIT (OUTPATIENT)
Dept: UROLOGY | Facility: CLINIC | Age: 83
End: 2023-10-19
Payer: COMMERCIAL

## 2023-10-19 VITALS
SYSTOLIC BLOOD PRESSURE: 118 MMHG | BODY MASS INDEX: 28.45 KG/M2 | HEART RATE: 80 BPM | HEIGHT: 66 IN | DIASTOLIC BLOOD PRESSURE: 56 MMHG | OXYGEN SATURATION: 99 % | WEIGHT: 177 LBS

## 2023-10-19 DIAGNOSIS — N40.1 BENIGN PROSTATIC HYPERPLASIA WITH URINARY FREQUENCY: ICD-10-CM

## 2023-10-19 DIAGNOSIS — R35.0 BENIGN PROSTATIC HYPERPLASIA WITH URINARY FREQUENCY: ICD-10-CM

## 2023-10-19 DIAGNOSIS — N40.2 NODULAR PROSTATE: ICD-10-CM

## 2023-10-19 DIAGNOSIS — N52.9 ERECTILE DYSFUNCTION, UNSPECIFIED ERECTILE DYSFUNCTION TYPE: ICD-10-CM

## 2023-10-19 DIAGNOSIS — N48.1 BALANITIS: Primary | ICD-10-CM

## 2023-10-19 PROCEDURE — 99204 OFFICE O/P NEW MOD 45 MIN: CPT | Performed by: UROLOGY

## 2023-10-26 ENCOUNTER — HOSPITAL ENCOUNTER (OUTPATIENT)
Dept: MRI IMAGING | Facility: HOSPITAL | Age: 83
Discharge: HOME | End: 2023-10-26
Attending: UROLOGY
Payer: COMMERCIAL

## 2023-10-26 DIAGNOSIS — N40.2 NODULAR PROSTATE: ICD-10-CM

## 2023-10-26 PROCEDURE — 76377 3D RENDER W/INTRP POSTPROCES: CPT

## 2023-10-26 PROCEDURE — A9585 GADOBUTROL INJECTION: HCPCS | Performed by: UROLOGY

## 2023-10-26 PROCEDURE — 72197 MRI PELVIS W/O & W/DYE: CPT

## 2023-10-26 PROCEDURE — G1004 CDSM NDSC: HCPCS

## 2023-10-26 RX ORDER — GADOBUTROL 604.72 MG/ML
8 INJECTION INTRAVENOUS
Status: COMPLETED | OUTPATIENT
Start: 2023-10-26 | End: 2023-10-26

## 2023-10-26 RX ADMIN — GADOBUTROL 8 ML: 604.72 INJECTION INTRAVENOUS at 08:57

## 2023-10-30 NOTE — PROGRESS NOTES
UROLOGY PROGRESS NOTE         NAME: Taco Salgado  AGE: 79 y.o. SEX: male  : 1940   MRN: [id number]    DATE: 10/29/2023  TIME: 8:21 PM    Assessment and Plan   Procedures     Impression:   1. Erectile dysfunction, unspecified erectile dysfunction type    2. Urinary frequency    3. Nodular prostate    4. Benign prostatic hyperplasia with urinary frequency    5. Balanitis         Plan: Plan is to see him back in 1 year with a repeat exam and PSA and recheck for balanitis. I told the patient throughout the year if he develops symptoms or concerns about balanitis happy to see him. He agrees and understands. Chief Complaint   No chief complaint on file. History of Present Illness     HPI: Taco Salgado is a 79y.o. year old male who presents with follow-up to discuss multiparametric MRI results. Patient was last seen by me on 10/19/2023 for evaluation of balanitis. The time of the office visit his infection had cleared. Patient also had a right base nodule on TAVON exam.    He did have a prostate nodule that was suspicious and an MP MRI was performed on 10/26/2023 that came back a PI-RADS score of 2 and a 69 g size prostate. The patient's PSA on 10/20/2023 was 2.6. Patient is with a history of BPH on Flomax and has nonbothersome frequency likely related to Parkinson's disease. Also has erectile dysfunction nonbothersome    Patient currently no signs or symptoms suggestive of balanitis. I discussed with the MP MRI. I told the patient that it is not likely he has clinically significant prostate cancer and recommend observation he agrees. He no longer takes Flomax for BPH and he is fine the way he is voiding. ED nonbothersome. Patient also happy with his PSA results.           The following portions of the patient's history were reviewed and updated as appropriate: allergies, current medications, past family history, past medical history, past social history, past surgical history and problem list.  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)     Benign essential hypertension     Diabetes mellitus (HCC)     Diabetes, polyneuropathy (HCC)     DJD (degenerative joint disease)     GERD (gastroesophageal reflux disease)     Hx of heart artery stent     x 18    Hyperlipidemia     Hypertension     Renal disorder     1 functioning kidney    Rosacea     UTI (urinary tract infection)      Past Surgical History:   Procedure Laterality Date    BACK SURGERY      CARDIAC SURGERY      CAROTID STENT      CHOLECYSTECTOMY      EAR SURGERY      EYE SURGERY Bilateral     cataract     shoulder  Review of Systems     Const: Denies chills, fever and weight loss. CV: Denies chest pain. Resp: Denies SOB. GI: Denies abdominal pain, nausea and vomiting. : Denies symptoms other than stated above. Musculo: Denies back pain. Objective   There were no vitals taken for this visit. Physical Exam  Const: Appears healthy and well developed. No signs of acute distress present. Resp: Respirations are regular and unlabored. CV: Rate is regular. Rhythm is regular. Abdomen: Abdomen is soft, nontender, and nondistended. Kidneys are not palpable. : dnp  Psych: Patient's attitude is cooperative.  Mood is normal. Affect is normal.    Current Medications     Current Outpatient Medications:     albuterol (PROVENTIL HFA,VENTOLIN HFA) 90 mcg/act inhaler, Inhale 2 puffs every 6 (six) hours as needed for wheezing, Disp: , Rfl:     amLODIPine (NORVASC) 5 mg tablet, Take 5 mg by mouth daily, Disp: , Rfl:     ASPIRIN 81 PO, Take 81 mg by mouth daily, Disp: , Rfl:     betamethasone, augmented, (Diprolene) 0.05 % ointment, Apply topically, Disp: , Rfl:     carbidopa-levodopa (SINEMET)  mg per tablet, Take 1 tablet by mouth 3 (three) times a day, Disp: , Rfl:     co-enzyme Q-10 50 MG capsule, Take 100 mg by mouth daily, Disp: , Rfl:     docusate sodium (COLACE) 250 MG capsule, Take 250 mg by mouth daily, Disp: , Rfl:     doxycycline hyclate (VIBRAMYCIN) 100 mg capsule, Take 100 mg by mouth every 12 (twelve) hours, Disp: , Rfl:     DULoxetine (CYMBALTA) 30 mg delayed release capsule, Take 30 mg by mouth daily, Disp: , Rfl:     ezetimibe (ZETIA) 10 mg tablet, Take 10 mg by mouth daily, Disp: , Rfl:     fluticasone (FLONASE) 50 mcg/act nasal spray, 1 spray into each nostril daily, Disp: , Rfl:     glucosamine-chondroitin 500-400 MG tablet, Take 1 tablet by mouth 2 (two) times a day, Disp: , Rfl:     insulin detemir (LEVEMIR) 100 units/mL subcutaneous injection, Inject under the skin, Disp: , Rfl:     insulin regular (HumuLIN R,NovoLIN R) 100 units/mL injection, Inject under the skin, Disp: , Rfl:     metFORMIN (GLUCOPHAGE) 500 mg tablet, Take 750 mg by mouth daily with breakfast, Disp: , Rfl:     Multiple Vitamin (multivitamin) tablet, Take 1 tablet by mouth daily, Disp: , Rfl:     Omega-3 Fatty Acids (fish oil) 1,000 mg, Take 1,000 mg by mouth daily, Disp: , Rfl:     pantoprazole (PROTONIX) 40 mg tablet, Take 40 mg by mouth daily, Disp: , Rfl:     pregabalin (LYRICA) 50 mg capsule, Take 50 mg by mouth 2 (two) times a day, Disp: , Rfl:     rosuvastatin (CRESTOR) 20 MG tablet, Take 20 mg by mouth daily, Disp: , Rfl:     sucralfate (CARAFATE) 1 g tablet, Take 1 g by mouth 2 (two) times a day, Disp: , Rfl:     traZODone (DESYREL) 50 mg tablet, Take 50 mg by mouth daily at bedtime, Disp: , Rfl:         Frandy Palomares MD

## 2023-11-03 ENCOUNTER — OFFICE VISIT (OUTPATIENT)
Dept: UROLOGY | Facility: CLINIC | Age: 83
End: 2023-11-03
Payer: COMMERCIAL

## 2023-11-03 VITALS
BODY MASS INDEX: 21.69 KG/M2 | HEART RATE: 90 BPM | HEIGHT: 66 IN | WEIGHT: 135 LBS | SYSTOLIC BLOOD PRESSURE: 152 MMHG | TEMPERATURE: 97.5 F | DIASTOLIC BLOOD PRESSURE: 76 MMHG | OXYGEN SATURATION: 96 %

## 2023-11-03 DIAGNOSIS — N52.9 ERECTILE DYSFUNCTION, UNSPECIFIED ERECTILE DYSFUNCTION TYPE: Primary | ICD-10-CM

## 2023-11-03 DIAGNOSIS — N40.2 NODULAR PROSTATE: ICD-10-CM

## 2023-11-03 DIAGNOSIS — N48.1 BALANITIS: ICD-10-CM

## 2023-11-03 DIAGNOSIS — R35.0 URINARY FREQUENCY: ICD-10-CM

## 2023-11-03 DIAGNOSIS — N40.1 BENIGN PROSTATIC HYPERPLASIA WITH URINARY FREQUENCY: ICD-10-CM

## 2023-11-03 DIAGNOSIS — R35.0 BENIGN PROSTATIC HYPERPLASIA WITH URINARY FREQUENCY: ICD-10-CM

## 2023-11-03 PROCEDURE — 99213 OFFICE O/P EST LOW 20 MIN: CPT | Performed by: UROLOGY

## 2023-11-03 RX ORDER — CLOPIDOGREL BISULFATE 75 MG/1
75 TABLET ORAL DAILY
COMMUNITY
Start: 2023-10-07

## 2024-10-22 ENCOUNTER — TELEPHONE (OUTPATIENT)
Dept: UROLOGY | Facility: CLINIC | Age: 84
End: 2024-10-22

## 2024-10-22 DIAGNOSIS — R35.0 BENIGN PROSTATIC HYPERPLASIA WITH URINARY FREQUENCY: Primary | ICD-10-CM

## 2024-10-22 DIAGNOSIS — N40.1 BENIGN PROSTATIC HYPERPLASIA WITH URINARY FREQUENCY: Primary | ICD-10-CM

## 2024-10-22 RX ORDER — TAMSULOSIN HYDROCHLORIDE 0.4 MG/1
0.4 CAPSULE ORAL
COMMUNITY
Start: 2024-10-11

## 2024-10-22 RX ORDER — SULFAMETHOXAZOLE AND TRIMETHOPRIM 800; 160 MG/1; MG/1
TABLET ORAL EVERY 12 HOURS
COMMUNITY
Start: 2024-06-17

## 2024-10-22 RX ORDER — INSULIN GLARGINE 100 [IU]/ML
INJECTION, SOLUTION SUBCUTANEOUS
COMMUNITY
Start: 2024-10-11

## 2024-10-22 RX ORDER — AMLODIPINE BESYLATE AND BENAZEPRIL HYDROCHLORIDE 2.5; 1 MG/1; MG/1
CAPSULE ORAL
COMMUNITY

## 2024-10-22 NOTE — TELEPHONE ENCOUNTER
Left message for pt to call office back. Please advise as below.      Please remind pt to get his PSA drawn 1 week before appt 10/6.

## 2025-06-04 ENCOUNTER — OFFICE VISIT (OUTPATIENT)
Dept: URGENT CARE | Facility: CLINIC | Age: 85
End: 2025-06-04
Payer: COMMERCIAL

## 2025-06-04 VITALS
TEMPERATURE: 97.7 F | DIASTOLIC BLOOD PRESSURE: 90 MMHG | WEIGHT: 135.8 LBS | BODY MASS INDEX: 21.83 KG/M2 | HEART RATE: 81 BPM | HEIGHT: 66 IN | RESPIRATION RATE: 18 BRPM | SYSTOLIC BLOOD PRESSURE: 142 MMHG | OXYGEN SATURATION: 97 %

## 2025-06-04 DIAGNOSIS — H61.21 IMPACTED CERUMEN OF RIGHT EAR: Primary | ICD-10-CM

## 2025-06-04 PROCEDURE — S9083 URGENT CARE CENTER GLOBAL: HCPCS

## 2025-06-04 PROCEDURE — 99213 OFFICE O/P EST LOW 20 MIN: CPT

## 2025-06-04 PROCEDURE — 69210 REMOVE IMPACTED EAR WAX UNI: CPT

## 2025-06-04 RX ORDER — METOPROLOL SUCCINATE 25 MG/1
1 TABLET, EXTENDED RELEASE ORAL DAILY
COMMUNITY
Start: 2025-04-05

## 2025-06-04 NOTE — PROGRESS NOTES
[facility]        NAME: Ottoniel Reyez is a 84 y.o. male  : 1940    MRN: [id number]  DATE: 2025  TIME: 4:14 PM    Assessment and Plan   Impacted cerumen of right ear [H61.21]  1. Impacted cerumen of right ear  Ear cerumen removal        Cerumen removed with irrigation/lavage and curette. Tolerated well. Hearing diminished at baseline. TM intact and non-erythematous. Encouraged continued supportive measures.  Follow up with PCP in 3-5 days or proceed to emergency department for worsening symptoms.  Patient verbalized understanding of instructions given.       Patient Instructions     Cerumen removed  Follow up with [facility]   Follow up with PCP in 3-5 days.  Proceed to  ER if symptoms worsen.    If tests have been performed at [facility], our office will contact you with results if changes need to be made to the care plan discussed with you at the visit.  [facility]    Chief Complaint     Chief Complaint   Patient presents with    ear blocked     Pt went to [facility] today and was told both of his ears are blocked and he has to come to urgent care to get the wax removed.          History of Present Illness       84-year-old male with a past medical history significant for type II DM, Parkinson's disease, hypertension, and hearing loss presents with complaints of earwax blockage.  Patient reports at [facility] for hearing aids and noticed bilateral cerumen impactions, right greater than left.  Patient reports asymptomatic.  No ear drainage.  No fever or URI symptoms.        Review of Systems   Review of Systems   Constitutional:  Negative for chills and fever.   HENT:  Positive for hearing loss. Negative for congestion, ear discharge, ear pain, rhinorrhea and sore throat.    Eyes:  Negative for discharge.   Respiratory:  Negative for cough, shortness of breath and wheezing.    Cardiovascular:  Negative for chest pain.   Gastrointestinal:   "Negative for abdominal pain, diarrhea, nausea and vomiting.   Skin:  Negative for rash.         Current Medications     Current Medications[1]    Current Allergies     Allergies as of 06/04/2025 - Reviewed 06/04/2025   Allergen Reaction Noted    Meloxicam Nausea Only 07/27/2016    Morphine Other (See Comments) and GI Intolerance 07/27/2016    Shellfish allergy - food allergy Hives 08/22/2023            The following portions of the patient's history were reviewed and updated as appropriate: allergies, current medications, past family history, past medical history, past social history, past surgical history and problem list.     Past Medical History[2]    Past Surgical History[3]    Family History[4]      Medications have been verified.        Objective   /90   Pulse 81   Temp 97.7 °F (36.5 °C)   Resp 18   Ht 5' 6\" (1.676 m)   Wt 61.6 kg (135 lb 12.8 oz)   SpO2 97%   BMI 21.92 kg/m²   No LMP for male patient.       Physical Exam     Physical Exam  Vitals and nursing note reviewed.   Constitutional:       General: He is not in acute distress.     Appearance: He is not toxic-appearing.   HENT:      Head: Normocephalic.      Right Ear: There is impacted cerumen.      Left Ear: Tympanic membrane, ear canal and external ear normal.     Eyes:      Conjunctiva/sclera: Conjunctivae normal.     Pulmonary:      Effort: Pulmonary effort is normal.     Skin:     General: Skin is warm and dry.     Neurological:      Mental Status: He is alert and oriented to person, place, and time.      Gait: Gait is intact.     Psychiatric:         Mood and Affect: Mood normal.         Behavior: Behavior normal.             Ear cerumen removal    Date/Time: 6/4/2025 4:14 PM    Performed by: KWAME Mazariegos  Authorized by: Nasir Smith DO    Universal Protocol:  procedure performed by consultantConsent: Verbal consent obtained  Risks and benefits: risks, benefits and alternatives were discussed  Consent given by: " patient  Patient understanding: patient states understanding of the procedure being performed    Patient location:  Clinic  Procedure details:     Location:  R ear    Procedure type: irrigation with instrumentation      Instrumentation: curette    Post-procedure details:     Complication:  None    Hearing quality:  Diminished    Patient tolerance of procedure:  Tolerated well, no immediate complications               [1]   Current Outpatient Medications:     albuterol (PROVENTIL HFA,VENTOLIN HFA) 90 mcg/act inhaler, Inhale 2 puffs every 6 (six) hours as needed for wheezing, Disp: , Rfl:     amLODIPine (NORVASC) 5 mg tablet, Take 5 mg by mouth in the morning., Disp: , Rfl:     amLODIPine-benazepril (LOTREL 2.5-10) 2.5-10 MG per capsule, , Disp: , Rfl:     ASPIRIN 81 PO, Take 81 mg by mouth in the morning., Disp: , Rfl:     carbidopa-levodopa (SINEMET)  mg per tablet, Take 1 tablet by mouth 3 (three) times a day, Disp: , Rfl:     clopidogrel (PLAVIX) 75 mg tablet, Take 75 mg by mouth in the morning., Disp: , Rfl:     co-enzyme Q-10 50 MG capsule, Take 100 mg by mouth in the morning., Disp: , Rfl:     docusate sodium (COLACE) 250 MG capsule, Take 250 mg by mouth daily, Disp: , Rfl:     doxycycline hyclate (VIBRAMYCIN) 100 mg capsule, Take 100 mg by mouth every 12 (twelve) hours, Disp: , Rfl:     DULoxetine (CYMBALTA) 30 mg delayed release capsule, Take 30 mg by mouth in the morning., Disp: , Rfl:     ezetimibe (ZETIA) 10 mg tablet, Take 10 mg by mouth in the morning., Disp: , Rfl:     fluticasone (FLONASE) 50 mcg/act nasal spray, 1 spray into each nostril daily, Disp: , Rfl:     glucosamine-chondroitin 500-400 MG tablet, Take 1 tablet by mouth in the morning and 1 tablet before bedtime., Disp: , Rfl:     Insulin Pen Needle 32G X 6 MM MISC, USE ONE NEEDLE DAILY WITH BASAGLAR KWIKPEN, Disp: , Rfl:     insulin regular (HumuLIN R,NovoLIN R) 100 units/mL injection, Inject under the skin, Disp: , Rfl:     metFORMIN  (GLUCOPHAGE) 1000 MG tablet, Take 1,000 mg by mouth in the morning, Disp: , Rfl:     metoprolol succinate (TOPROL-XL) 25 mg 24 hr tablet, Take 1 tablet by mouth in the morning, Disp: , Rfl:     pantoprazole (PROTONIX) 40 mg tablet, Take 40 mg by mouth in the morning., Disp: , Rfl:     pregabalin (LYRICA) 50 mg capsule, Take 50 mg by mouth in the morning., Disp: , Rfl:     rosuvastatin (CRESTOR) 20 MG tablet, Take 20 mg by mouth in the morning., Disp: , Rfl:     sucralfate (CARAFATE) 1 g tablet, Take 1 g by mouth in the morning., Disp: , Rfl:     tamsulosin (FLOMAX) 0.4 mg, 0.4 mg, Disp: , Rfl:     betamethasone, augmented, (Diprolene) 0.05 % ointment, Apply topically (Patient not taking: Reported on 11/3/2023), Disp: , Rfl:     DOCOSAHEXAENOIC ACID-EPA PO, Take by mouth 120-180 mg cap (Patient not taking: Reported on 6/4/2025), Disp: , Rfl:     insulin detemir (LEVEMIR) 100 units/mL subcutaneous injection, Inject 30 Units under the skin (Patient not taking: Reported on 6/4/2025), Disp: , Rfl:     Insulin Glargine Solostar (Basaglar KwikPen) 100 UNIT/ML SOPN, Start: 10/11/24 5:15:00 AM EDT, 30 unit =, subQ, qhs (Patient not taking: Reported on 6/4/2025), Disp: , Rfl:     metoprolol tartrate (LOPRESSOR) 25 mg tablet, Take 0.5 tablets by mouth in the morning (Patient not taking: Reported on 6/4/2025), Disp: , Rfl:     Multiple Vitamin (multivitamin) tablet, Take 1 tablet by mouth daily (Patient not taking: Reported on 11/3/2023), Disp: , Rfl:     Omega-3 Fatty Acids (fish oil) 1,000 mg, Take 1,000 mg by mouth daily (Patient not taking: Reported on 11/3/2023), Disp: , Rfl:     sulfamethoxazole-trimethoprim (Bactrim DS) 800-160 mg per tablet, Every 12 hours (Patient not taking: Reported on 6/4/2025), Disp: , Rfl:     traZODone (DESYREL) 50 mg tablet, Take 50 mg by mouth daily at bedtime (Patient not taking: Reported on 6/4/2025), Disp: , Rfl:   [2]   Past Medical History:  Diagnosis Date    Atherosclerotic heart disease  "native coronary artery w/angina pectoris (HCC)     Benign essential hypertension     Chronic kidney disease     \"one dead kidney -right \"    Diabetes mellitus (HCC)     Diabetes, polyneuropathy (HCC)     DJD (degenerative joint disease)     GERD (gastroesophageal reflux disease)     Hx of heart artery stent     x 18    Hyperlipidemia     Hypertension     Parkinson disease (HCC)     Renal disorder     1 functioning kidney    Rosacea     UTI (urinary tract infection)    [3]   Past Surgical History:  Procedure Laterality Date    BACK SURGERY      CARDIAC SURGERY      CAROTID STENT      CHOLECYSTECTOMY      EAR SURGERY      EYE SURGERY Bilateral     cataract   [4] No family history on file.    "

## 2025-06-04 NOTE — PATIENT INSTRUCTIONS
Cerumen removed  Follow up with [facility]   Follow up with PCP in 3-5 days.  Proceed to  ER if symptoms worsen.    If tests have been performed at [facility], our office will contact you with results if changes need to be made to the care plan discussed with you at the visit.  You can review your full results on [facility].